# Patient Record
Sex: MALE | Race: WHITE
[De-identification: names, ages, dates, MRNs, and addresses within clinical notes are randomized per-mention and may not be internally consistent; named-entity substitution may affect disease eponyms.]

---

## 2019-01-03 ENCOUNTER — HOSPITAL ENCOUNTER (EMERGENCY)
Dept: HOSPITAL 76 - ED | Age: 24
Discharge: HOME | End: 2019-01-03
Payer: COMMERCIAL

## 2019-01-03 ENCOUNTER — HOSPITAL ENCOUNTER (OUTPATIENT)
Dept: HOSPITAL 76 - EMS | Age: 24
Discharge: ANOTHER HOSPITAL | End: 2019-01-03
Attending: SURGERY
Payer: COMMERCIAL

## 2019-01-03 ENCOUNTER — HOSPITAL ENCOUNTER (OUTPATIENT)
Dept: HOSPITAL 76 - EMS | Age: 24
End: 2019-01-03
Attending: SURGERY
Payer: SELF-PAY

## 2019-01-03 VITALS — SYSTOLIC BLOOD PRESSURE: 126 MMHG | DIASTOLIC BLOOD PRESSURE: 51 MMHG

## 2019-01-03 DIAGNOSIS — R56.9: Primary | ICD-10-CM

## 2019-01-03 DIAGNOSIS — G40.909: Primary | ICD-10-CM

## 2019-01-03 LAB
ANION GAP SERPL CALCULATED.4IONS-SCNC: 12 MMOL/L (ref 6–13)
BASOPHILS NFR BLD AUTO: 0.1 10^3/UL (ref 0–0.1)
BASOPHILS NFR BLD AUTO: 0.5 %
BUN SERPL-MCNC: 16 MG/DL (ref 6–20)
CALCIUM UR-MCNC: 9 MG/DL (ref 8.5–10.3)
CHLORIDE SERPL-SCNC: 103 MMOL/L (ref 101–111)
CO2 SERPL-SCNC: 21 MMOL/L (ref 21–32)
CREAT SERPLBLD-SCNC: 0.9 MG/DL (ref 0.6–1.2)
EOSINOPHIL # BLD AUTO: 0 10^3/UL (ref 0–0.7)
EOSINOPHIL NFR BLD AUTO: 0.1 %
ERYTHROCYTE [DISTWIDTH] IN BLOOD BY AUTOMATED COUNT: 12.4 % (ref 12–15)
GFRSERPLBLD MDRD-ARVRAT: 103 ML/MIN/{1.73_M2} (ref 89–?)
GLUCOSE SERPL-MCNC: 108 MG/DL (ref 70–100)
HGB UR QL STRIP: 13 G/DL (ref 14–18)
LYMPHOCYTES # SPEC AUTO: 0.8 10^3/UL (ref 1.5–3.5)
LYMPHOCYTES NFR BLD AUTO: 7.1 %
MCH RBC QN AUTO: 31.5 PG (ref 27–31)
MCHC RBC AUTO-ENTMCNC: 34.3 G/DL (ref 32–36)
MCV RBC AUTO: 91.8 FL (ref 80–94)
MONOCYTES # BLD AUTO: 0.4 10^3/UL (ref 0–1)
MONOCYTES NFR BLD AUTO: 3.9 %
NEUTROPHILS # BLD AUTO: 9.5 10^3/UL (ref 1.5–6.6)
NEUTROPHILS # SNV AUTO: 10.7 X10^3/UL (ref 4.8–10.8)
NEUTROPHILS NFR BLD AUTO: 88.4 %
PDW BLD AUTO: 7.3 FL (ref 7.4–11.4)
PLATELET # BLD: 227 10^3/UL (ref 130–450)
RBC MAR: 4.13 10^6/UL (ref 4.7–6.1)
SODIUM SERPLBLD-SCNC: 136 MMOL/L (ref 135–145)

## 2019-01-03 PROCEDURE — 80048 BASIC METABOLIC PNL TOTAL CA: CPT

## 2019-01-03 PROCEDURE — 99284 EMERGENCY DEPT VISIT MOD MDM: CPT

## 2019-01-03 PROCEDURE — 36415 COLL VENOUS BLD VENIPUNCTURE: CPT

## 2019-01-03 PROCEDURE — 96361 HYDRATE IV INFUSION ADD-ON: CPT

## 2019-01-03 PROCEDURE — 99283 EMERGENCY DEPT VISIT LOW MDM: CPT

## 2019-01-03 PROCEDURE — 96375 TX/PRO/DX INJ NEW DRUG ADDON: CPT

## 2019-01-03 PROCEDURE — 96365 THER/PROPH/DIAG IV INF INIT: CPT

## 2019-01-03 PROCEDURE — 85025 COMPLETE CBC W/AUTO DIFF WBC: CPT

## 2019-01-03 NOTE — ED PHYSICIAN DOCUMENTATION
History of Present Illness





- Stated complaint


Stated Complaint: Seizures





- Chief complaint


Chief Complaint: Neuro





- Additonal information


Additional information: 





hx from pt


22 y/o male


hx epilepsy


followed by Dr Jacobo at [place]


has had an extensive wup


due to inc seizures had keppra inc from 3 pills per day to 4 pill per day at 

[facility] for holidays


worked out for three hr yesterday, sat in sauna, took an energy work out suppl

ement got dehydrated


today had two seziures, grand mal, no injury


compliant with meds, otherwise well recently





Review of Systems


Constitutional: denies: Fever, Chills


Throat: denies: Sore throat


Cardiac: denies: Chest pain / pressure, Palpitations


Respiratory: denies: Dyspnea


GI: denies: Nausea, Vomiting


Musculoskeletal: denies: Neck pain, Back pain


Neurologic: reports: Seizure.  denies: Headache, Head injury


Endocrine: denies: Easy bruising / bleeding


Immunocompromised: denies: Immunocompromised





PD PAST MEDICAL HISTORY





- Past Medical History


Past Medical History: Yes


Neuro: Seizure disorder





- Past Surgical History


Past Surgical History: No





- Allergies


Allergies/Adverse Reactions: 


                                    Allergies











Allergy/AdvReac Type Severity Reaction Status Date / Time


 


No Known Drug Allergies Allergy   Verified 01/03/19 12:47














- Social History


Does the pt smoke?: No


Smoking Status: Never smoker


Does the pt drink ETOH?: No


Does the pt have substance abuse?: No





- Immunizations


Immunizations are current?: Yes





PD ED PE NORMAL





- Vitals


Vital signs reviewed: Yes





- General


General: No: Alert and oriented X 3 (confused and post ictal)





- HEENT


HEENT: Atraumatic, PERRL





- Neck


Neck: Supple, no meningeal sign





- Cardiac


Cardiac: RRR





- Respiratory


Respiratory: No respiratory distress





- Abdomen


Abdomen: Soft, Non tender





- Derm


Derm: Normal color





- Extremities


Extremities: No tenderness to palpate, No edema, No calf tenderness / cord





- Neuro


Neuro: No motor deficit.  No: Alert and oriented X 3 (post ictal)


Eye Opening: To Voice


Motor: Obeys Commands


Verbal: Oriented


GCS Score: 14





Results





- Vitals


Vitals: 


                               Vital Signs - 24 hr











  01/03/19 01/03/19





  12:47 16:15


 


Temperature 36.5 C 37.2 C


 


Heart Rate 77 64


 


Respiratory 16 15





Rate  


 


Blood Pressure 145/59 H 126/51 L


 


O2 Saturation 97 95








                                     Oxygen











O2 Source                      Room air

















- Labs


Labs: 


                                Laboratory Tests











  01/03/19 01/03/19





  13:20 13:20


 


WBC  10.7 


 


RBC  4.13 L 


 


Hgb  13.0 L 


 


Hct  37.9 L 


 


MCV  91.8 


 


MCH  31.5 H 


 


MCHC  34.3 


 


RDW  12.4 


 


Plt Count  227 


 


MPV  7.3 L 


 


Neut # (Auto)  9.5 H 


 


Lymph # (Auto)  0.8 L 


 


Mono # (Auto)  0.4 


 


Eos # (Auto)  0.0 


 


Baso # (Auto)  0.1 


 


Absolute Nucleated RBC  0.01 


 


Nucleated RBC %  0.1 


 


Sodium   136


 


Potassium   4.0


 


Chloride   103


 


Carbon Dioxide   21


 


Anion Gap   12.0


 


BUN   16


 


Creatinine   0.9


 


Estimated GFR (MDRD)   103


 


Glucose   108 H


 


Calcium   9.0














PD MEDICAL DECISION MAKING





- ED course


ED course: 





pt gradually recovered from his postictal state


awake and alert


had a HA and nausea common for him after seizures (had no injury, was in bed 

then on couch) tried and unable to contact pts neurologist to discuss med 

adjustments


observed several hr in ED and no further seizures after ativan


will dc








Departure





- Departure


Disposition: 01 Home, Self Care


Clinical Impression: 


 Seizure





Condition: Good


Instructions:  ED Seizure Recurrent


Comments: 


Please continue your medications as before until you are able to contact your 

neurologist to discuss further medication management


Discharge Date/Time: 01/03/19 16:24

## 2019-01-04 ENCOUNTER — HOSPITAL ENCOUNTER (OUTPATIENT)
Dept: HOSPITAL 76 - EMS | Age: 24
Discharge: ANOTHER HOSPITAL | End: 2019-01-04
Attending: SURGERY
Payer: COMMERCIAL

## 2019-01-04 DIAGNOSIS — R56.9: Primary | ICD-10-CM
